# Patient Record
Sex: MALE | Race: WHITE | NOT HISPANIC OR LATINO | URBAN - NONMETROPOLITAN AREA
[De-identification: names, ages, dates, MRNs, and addresses within clinical notes are randomized per-mention and may not be internally consistent; named-entity substitution may affect disease eponyms.]

---

## 2017-02-01 PROBLEM — H35.363: Noted: 2017-02-01

## 2017-02-01 PROBLEM — H52.4: Noted: 2017-02-01

## 2017-02-01 PROBLEM — E10.9: Noted: 2017-02-01

## 2017-02-01 PROBLEM — H52.223: Noted: 2017-02-01

## 2017-02-01 PROBLEM — Z96.1: Noted: 2017-02-01

## 2019-10-08 ENCOUNTER — IMPORTED ENCOUNTER (OUTPATIENT)
Dept: URBAN - NONMETROPOLITAN AREA CLINIC 1 | Facility: CLINIC | Age: 74
End: 2019-10-08

## 2019-10-08 PROCEDURE — 92014 COMPRE OPH EXAM EST PT 1/>: CPT

## 2019-10-08 PROCEDURE — 92015 DETERMINE REFRACTIVE STATE: CPT

## 2019-10-08 NOTE — PATIENT DISCUSSION
PSEUDOPHAKIA OUMONITORDM s DR-Stressed the importance of keeping blood sugars under control blood pressure under control and weight normalization and regular visits with PCP. -Explained the possible effects of poorly controlled diabetes and the damage that diabetes can cause to ocular health. -Patient to check HgbA1C.-Pt instructed to contact our office with any vision changes. S/P LASER FOR RD REPAIR OSDEG DRUSENMONITORSTABLE OU; Dr's Notes: From [facility].  seeing Lui garcia

## 2021-01-08 ENCOUNTER — IMPORTED ENCOUNTER (OUTPATIENT)
Dept: URBAN - NONMETROPOLITAN AREA CLINIC 1 | Facility: CLINIC | Age: 76
End: 2021-01-08

## 2021-01-08 PROCEDURE — 92015 DETERMINE REFRACTIVE STATE: CPT

## 2021-01-08 PROCEDURE — 92014 COMPRE OPH EXAM EST PT 1/>: CPT

## 2021-01-08 NOTE — PATIENT DISCUSSION
PSEUDOPHAKIA OUMONITOR for pcoDM s DR-Stressed the importance of keeping blood sugars under control blood pressure under control and weight normalization and regular visits with PCP. -Explained the possible effects of poorly controlled diabetes and the damage that diabetes can cause to ocular health. -Patient to check HgbA1C.-Pt instructed to contact our office with any vision changes. S/P LASER FOR RD REPAIR OSDEG DRUSENMONITORSTABLE OUPresbyopia-Discussed diagnosis with patient. Astigmatism-Discussed diagnosis with patient. Updated spec Rx given. Recommend lens that will provide comfort as well as protect safety and health of eyes.; Dr's Notes: From [facility].  seeing [facility] now [address] consultants Slava Ward

## 2022-04-15 ASSESSMENT — TONOMETRY
OS_IOP_MMHG: 15
OD_IOP_MMHG: 13
OS_IOP_MMHG: 14
OD_IOP_MMHG: 15

## 2022-04-15 ASSESSMENT — VISUAL ACUITY
OS_CC: 20/25
OD_SC: 20/20-2
OD_CC: 20/40
OU_CC: 20/25
OS_SC: 20/20

## 2022-07-27 ENCOUNTER — ESTABLISHED PATIENT (OUTPATIENT)
Dept: RURAL CLINIC 1 | Facility: CLINIC | Age: 77
End: 2022-07-27

## 2022-07-27 DIAGNOSIS — H52.4: ICD-10-CM

## 2022-07-27 DIAGNOSIS — H35.363: ICD-10-CM

## 2022-07-27 DIAGNOSIS — E10.9: ICD-10-CM

## 2022-07-27 DIAGNOSIS — H52.223: ICD-10-CM

## 2022-07-27 DIAGNOSIS — Z96.1: ICD-10-CM

## 2022-07-27 PROCEDURE — 92134 CPTRZ OPH DX IMG PST SGM RTA: CPT

## 2022-07-27 PROCEDURE — 92014 COMPRE OPH EXAM EST PT 1/>: CPT

## 2022-07-27 ASSESSMENT — VISUAL ACUITY
OD_CC: 20/20-1
OU_CC: 20/20
OS_PH: 20/25-2
OS_CC: 20/40-1

## 2022-07-27 ASSESSMENT — TONOMETRY
OD_IOP_MMHG: 14
OS_IOP_MMHG: 14

## 2022-07-27 NOTE — PATIENT DISCUSSION
PSEUDOPHAKIA OUMONITOR for pcoDM s DR-Stressed the importance of keeping blood sugars under control blood pressure under control and weight normalization and regular visits with PCP. -Explained the possible effects of poorly controlled diabetes and the damage that diabetes can cause to ocular health. -Patient to check HgbA1C.-Pt instructed to contact our office with any vision changes. S/P LASER FOR RD REPAIR OSDEG DRUSENMONITORSTABLE OUPresbyopia-Discussed diagnosis with patient. Astigmatism-Discussed diagnosis with patient. Updated spec Rx given. Recommend lens that will provide comfort as well as protect safety and health of eyes.; Dr's Notes: From [email or web address]. seeing Marlene Rollinsricia now [address] consultants Belgica Castillo.

## 2023-07-28 ENCOUNTER — ESTABLISHED PATIENT (OUTPATIENT)
Dept: RURAL CLINIC 1 | Facility: CLINIC | Age: 78
End: 2023-07-28

## 2023-07-28 DIAGNOSIS — H35.363: ICD-10-CM

## 2023-07-28 DIAGNOSIS — Z96.1: ICD-10-CM

## 2023-07-28 DIAGNOSIS — E10.9: ICD-10-CM

## 2023-07-28 PROCEDURE — 92014 COMPRE OPH EXAM EST PT 1/>: CPT

## 2023-07-28 PROCEDURE — 92134 CPTRZ OPH DX IMG PST SGM RTA: CPT

## 2023-07-28 ASSESSMENT — VISUAL ACUITY
OU_SC: 20/20-1
OS_CC: 20/25
OD_CC: 20/25
OD_SC: 20/40
OU_CC: 20/25
OS_SC: 20/20-1

## 2023-07-28 ASSESSMENT — TONOMETRY
OD_IOP_MMHG: 15
OS_IOP_MMHG: 15

## 2024-10-29 ENCOUNTER — COMPREHENSIVE EXAM (OUTPATIENT)
Dept: RURAL CLINIC 1 | Facility: CLINIC | Age: 79
End: 2024-10-29

## 2024-10-29 DIAGNOSIS — Z96.1: ICD-10-CM

## 2024-10-29 DIAGNOSIS — H35.363: ICD-10-CM

## 2024-10-29 DIAGNOSIS — E10.9: ICD-10-CM

## 2024-10-29 PROCEDURE — 92134 CPTRZ OPH DX IMG PST SGM RTA: CPT

## 2024-10-29 PROCEDURE — 92014 COMPRE OPH EXAM EST PT 1/>: CPT
